# Patient Record
Sex: MALE | Race: WHITE | NOT HISPANIC OR LATINO | Employment: OTHER | URBAN - METROPOLITAN AREA
[De-identification: names, ages, dates, MRNs, and addresses within clinical notes are randomized per-mention and may not be internally consistent; named-entity substitution may affect disease eponyms.]

---

## 2017-03-23 ENCOUNTER — FOLLOW UP (OUTPATIENT)
Dept: URBAN - METROPOLITAN AREA CLINIC 26 | Facility: CLINIC | Age: 67
End: 2017-03-23

## 2017-03-23 DIAGNOSIS — H40.1120: ICD-10-CM

## 2017-03-23 DIAGNOSIS — H35.373: ICD-10-CM

## 2017-03-23 DIAGNOSIS — H35.341: ICD-10-CM

## 2017-03-23 DIAGNOSIS — H43.392: ICD-10-CM

## 2017-03-23 PROCEDURE — 92014 COMPRE OPH EXAM EST PT 1/>: CPT

## 2017-03-23 PROCEDURE — G8427 DOCREV CUR MEDS BY ELIG CLIN: HCPCS

## 2017-03-23 PROCEDURE — 1036F TOBACCO NON-USER: CPT

## 2017-03-23 PROCEDURE — 2027F OPTIC NERVE HEAD EVAL DONE: CPT

## 2017-03-23 PROCEDURE — 92250 FUNDUS PHOTOGRAPHY W/I&R: CPT

## 2017-03-23 ASSESSMENT — VISUAL ACUITY
OD_CC: 20/40
OS_CC: 20/40-

## 2017-03-23 ASSESSMENT — TONOMETRY
OS_IOP_MMHG: 18
OD_IOP_MMHG: 14

## 2017-08-04 ENCOUNTER — FOLLOW UP (OUTPATIENT)
Dept: URBAN - METROPOLITAN AREA CLINIC 26 | Facility: CLINIC | Age: 67
End: 2017-08-04

## 2017-08-04 VITALS — WEIGHT: 315 LBS | BODY MASS INDEX: 45.1 KG/M2 | HEIGHT: 70 IN

## 2017-08-04 DIAGNOSIS — H02.833: ICD-10-CM

## 2017-08-04 DIAGNOSIS — H02.836: ICD-10-CM

## 2017-08-04 DIAGNOSIS — H25.12: ICD-10-CM

## 2017-08-04 DIAGNOSIS — H35.373: ICD-10-CM

## 2017-08-04 DIAGNOSIS — H43.392: ICD-10-CM

## 2017-08-04 DIAGNOSIS — H40.1120: ICD-10-CM

## 2017-08-04 DIAGNOSIS — H35.341: ICD-10-CM

## 2017-08-04 PROCEDURE — 92014 COMPRE OPH EXAM EST PT 1/>: CPT

## 2017-08-04 PROCEDURE — 1036F TOBACCO NON-USER: CPT

## 2017-08-04 PROCEDURE — 92250 FUNDUS PHOTOGRAPHY W/I&R: CPT

## 2017-08-04 PROCEDURE — G8417 CALC BMI ABV UP PARAM F/U: HCPCS

## 2017-08-04 PROCEDURE — 2027F OPTIC NERVE HEAD EVAL DONE: CPT

## 2017-08-04 PROCEDURE — 92134 CPTRZ OPH DX IMG PST SGM RTA: CPT

## 2017-08-04 PROCEDURE — 92083 EXTENDED VISUAL FIELD XM: CPT

## 2017-08-04 PROCEDURE — G8427 DOCREV CUR MEDS BY ELIG CLIN: HCPCS

## 2017-08-04 ASSESSMENT — VISUAL ACUITY
OD_CC: 20/25+1
OS_CC: 20/40-1

## 2017-08-04 ASSESSMENT — TONOMETRY
OD_IOP_MMHG: 15
OS_IOP_MMHG: 15

## 2017-12-26 ENCOUNTER — APPOINTMENT (RX ONLY)
Dept: URBAN - METROPOLITAN AREA CLINIC 121 | Facility: CLINIC | Age: 67
Setting detail: DERMATOLOGY
End: 2017-12-26

## 2017-12-26 DIAGNOSIS — L21.8 OTHER SEBORRHEIC DERMATITIS: ICD-10-CM

## 2017-12-26 DIAGNOSIS — I87.2 VENOUS INSUFFICIENCY (CHRONIC) (PERIPHERAL): ICD-10-CM

## 2017-12-26 DIAGNOSIS — D485 NEOPLASM OF UNCERTAIN BEHAVIOR OF SKIN: ICD-10-CM

## 2017-12-26 DIAGNOSIS — L40.0 PSORIASIS VULGARIS: ICD-10-CM

## 2017-12-26 PROBLEM — I83.11 VARICOSE VEINS OF RIGHT LOWER EXTREMITY WITH INFLAMMATION: Status: ACTIVE | Noted: 2017-12-26

## 2017-12-26 PROBLEM — D48.5 NEOPLASM OF UNCERTAIN BEHAVIOR OF SKIN: Status: ACTIVE | Noted: 2017-12-26

## 2017-12-26 PROBLEM — I83.12 VARICOSE VEINS OF LEFT LOWER EXTREMITY WITH INFLAMMATION: Status: ACTIVE | Noted: 2017-12-26

## 2017-12-26 PROCEDURE — 99202 OFFICE O/P NEW SF 15 MIN: CPT | Mod: 25

## 2017-12-26 PROCEDURE — ? PRESCRIPTION

## 2017-12-26 PROCEDURE — ? BIOPSY BY SHAVE METHOD

## 2017-12-26 PROCEDURE — 11100: CPT

## 2017-12-26 PROCEDURE — ? DIAGNOSIS COMMENT

## 2017-12-26 PROCEDURE — ? COUNSELING

## 2017-12-26 RX ORDER — TRIAMCINOLONE ACETONIDE 1 MG/G
CREAM TOPICAL BID
Qty: 1 | Refills: 0 | Status: ERX | COMMUNITY
Start: 2017-12-26

## 2017-12-26 RX ADMIN — TRIAMCINOLONE ACETONIDE: 1 CREAM TOPICAL at 18:35

## 2017-12-26 ASSESSMENT — LOCATION SIMPLE DESCRIPTION DERM
LOCATION SIMPLE: RIGHT ELBOW
LOCATION SIMPLE: NOSE
LOCATION SIMPLE: LEFT FOREHEAD
LOCATION SIMPLE: RIGHT FOREHEAD
LOCATION SIMPLE: LEFT PRETIBIAL REGION
LOCATION SIMPLE: LEFT ELBOW
LOCATION SIMPLE: RIGHT PRETIBIAL REGION

## 2017-12-26 ASSESSMENT — LOCATION DETAILED DESCRIPTION DERM
LOCATION DETAILED: LEFT INFERIOR FOREHEAD
LOCATION DETAILED: RIGHT PROXIMAL PRETIBIAL REGION
LOCATION DETAILED: RIGHT INFERIOR FOREHEAD
LOCATION DETAILED: LEFT PROXIMAL PRETIBIAL REGION
LOCATION DETAILED: LEFT ELBOW
LOCATION DETAILED: NASAL ROOT
LOCATION DETAILED: RIGHT ELBOW
LOCATION DETAILED: LEFT DISTAL PRETIBIAL REGION

## 2017-12-26 ASSESSMENT — LOCATION ZONE DERM
LOCATION ZONE: ARM
LOCATION ZONE: LEG
LOCATION ZONE: NOSE
LOCATION ZONE: FACE

## 2017-12-26 NOTE — PROCEDURE: MIPS QUALITY
Quality 111:Pneumonia Vaccination Status For Older Adults: Pneumococcal Vaccination Administered
Quality 131: Pain Assessment And Follow-Up: Pain assessment using a standardized tool is documented as negative, no follow-up plan required
Quality 226: Preventive Care And Screening: Tobacco Use: Screening And Cessation Intervention: Patient screened for tobacco and is an ex-smoker
Detail Level: Detailed
Quality 110: Preventive Care And Screening: Influenza Immunization: Influenza Immunization previously received during influenza season
Quality 130: Documentation Of Current Medications In The Medical Record: Current Medications Documented

## 2017-12-26 NOTE — PROCEDURE: BIOPSY BY SHAVE METHOD
Biopsy Method: Dermablade
Destruction After The Procedure: No
Lab: [facility]
Type Of Destruction Used: Curettage
Billing Type: [employer]
Biopsy Type: H and E
Additional Anesthesia Volume In Cc (Will Not Render If 0): 0
Notification Instructions: Patient will be notified of biopsy results. However, patient instructed to call the office if not contacted within 2 weeks.
Post-Care Instructions: I reviewed with the patient in detail post-care instructions. Patient is to keep the biopsy site dry overnight, and then apply bacitracin twice daily until healed. Patient may apply hydrogen peroxide soaks to remove any crusting.
Cryotherapy Text: The wound bed was treated with cryotherapy after the biopsy was performed.
Dressing: bandage
Electrodesiccation And Curettage Text: The wound bed was treated with electrodesiccation and curettage after the biopsy was performed.
Curettage Text: The wound bed was treated with curettage after the biopsy was performed.
Hemostasis: Drysol
Size Of Lesion In Cm: 0.6
Wound Care: Petrolatum
Lab Facility: [facility]
Anesthesia Type: 1% lidocaine with epinephrine
Electrodesiccation Text: The wound bed was treated with electrodesiccation after the biopsy was performed.
Detail Level: Detailed
Silver Nitrate Text: The wound bed was treated with silver nitrate after the biopsy was performed.
Anesthesia Volume In Cc (Will Not Render If 0): 0.5
Consent: Written consent was obtained and risks were reviewed including but not limited to scarring, infection, bleeding, scabbing, incomplete removal, nerve damage and allergy to anesthesia.

## 2017-12-26 NOTE — PROCEDURE: DIAGNOSIS COMMENT
Comment: Pt declines treatment
Detail Level: Simple
Comment: Patient will use Triamcinonlone . 1% on elbows

## 2018-01-05 ENCOUNTER — FOLLOW UP (OUTPATIENT)
Dept: URBAN - METROPOLITAN AREA CLINIC 26 | Facility: CLINIC | Age: 68
End: 2018-01-05

## 2018-01-05 VITALS — WEIGHT: 315 LBS | BODY MASS INDEX: 45.1 KG/M2 | HEIGHT: 70 IN

## 2018-01-05 DIAGNOSIS — H43.392: ICD-10-CM

## 2018-01-05 DIAGNOSIS — H40.1120: ICD-10-CM

## 2018-01-05 DIAGNOSIS — H02.836: ICD-10-CM

## 2018-01-05 DIAGNOSIS — H35.341: ICD-10-CM

## 2018-01-05 DIAGNOSIS — H25.12: ICD-10-CM

## 2018-01-05 DIAGNOSIS — H02.833: ICD-10-CM

## 2018-01-05 DIAGNOSIS — H35.373: ICD-10-CM

## 2018-01-05 PROCEDURE — 92134 CPTRZ OPH DX IMG PST SGM RTA: CPT

## 2018-01-05 PROCEDURE — 92250 FUNDUS PHOTOGRAPHY W/I&R: CPT

## 2018-01-05 PROCEDURE — 92014 COMPRE OPH EXAM EST PT 1/>: CPT

## 2018-01-05 PROCEDURE — G8417 CALC BMI ABV UP PARAM F/U: HCPCS

## 2018-01-05 PROCEDURE — G8427 DOCREV CUR MEDS BY ELIG CLIN: HCPCS

## 2018-01-05 PROCEDURE — 2027F OPTIC NERVE HEAD EVAL DONE: CPT

## 2018-01-05 PROCEDURE — 1036F TOBACCO NON-USER: CPT

## 2018-01-05 ASSESSMENT — TONOMETRY
OD_IOP_MMHG: 15
OS_IOP_MMHG: 17

## 2018-01-05 ASSESSMENT — VISUAL ACUITY
OD_SC: 20/25-2
OS_SC: 20/25-1

## 2018-01-30 ENCOUNTER — RX ONLY (OUTPATIENT)
Age: 68
Setting detail: RX ONLY
End: 2018-01-30

## 2018-01-30 RX ORDER — TRIAMCINOLONE ACETONIDE 1 MG/G
CREAM TOPICAL
Qty: 1 | Refills: 0 | Status: ERX

## 2018-08-10 ENCOUNTER — FOLLOW UP (OUTPATIENT)
Dept: URBAN - METROPOLITAN AREA CLINIC 26 | Facility: CLINIC | Age: 68
End: 2018-08-10

## 2018-08-10 DIAGNOSIS — H40.1120: ICD-10-CM

## 2018-08-10 DIAGNOSIS — H35.373: ICD-10-CM

## 2018-08-10 DIAGNOSIS — H43.392: ICD-10-CM

## 2018-08-10 DIAGNOSIS — H35.341: ICD-10-CM

## 2018-08-10 PROCEDURE — 92250 FUNDUS PHOTOGRAPHY W/I&R: CPT

## 2018-08-10 PROCEDURE — 92014 COMPRE OPH EXAM EST PT 1/>: CPT

## 2018-08-10 ASSESSMENT — TONOMETRY
OS_IOP_MMHG: 16
OD_IOP_MMHG: 15

## 2018-08-10 ASSESSMENT — VISUAL ACUITY
OD_SC: 20/25
OS_SC: 20/25

## 2019-02-07 ENCOUNTER — FOLLOW UP (OUTPATIENT)
Dept: URBAN - METROPOLITAN AREA CLINIC 26 | Facility: CLINIC | Age: 69
End: 2019-02-07

## 2019-02-07 VITALS — BODY MASS INDEX: 45.1 KG/M2 | WEIGHT: 315 LBS | HEIGHT: 70 IN

## 2019-02-07 DIAGNOSIS — H40.1120: ICD-10-CM

## 2019-02-07 DIAGNOSIS — H43.392: ICD-10-CM

## 2019-02-07 DIAGNOSIS — H35.373: ICD-10-CM

## 2019-02-07 DIAGNOSIS — H35.341: ICD-10-CM

## 2019-02-07 PROCEDURE — 92250 FUNDUS PHOTOGRAPHY W/I&R: CPT

## 2019-02-07 PROCEDURE — 92014 COMPRE OPH EXAM EST PT 1/>: CPT

## 2019-02-07 PROCEDURE — 92083 EXTENDED VISUAL FIELD XM: CPT

## 2019-02-07 ASSESSMENT — TONOMETRY
OS_IOP_MMHG: 24
OD_IOP_MMHG: 18

## 2019-02-07 ASSESSMENT — VISUAL ACUITY
OS_PH: 20/25+2
OS_SC: 20/30
OD_SC: 20/25-2

## 2019-08-08 ENCOUNTER — FOLLOW UP (OUTPATIENT)
Dept: URBAN - METROPOLITAN AREA CLINIC 26 | Facility: CLINIC | Age: 69
End: 2019-08-08

## 2019-08-08 DIAGNOSIS — H43.392: ICD-10-CM

## 2019-08-08 DIAGNOSIS — H35.373: ICD-10-CM

## 2019-08-08 DIAGNOSIS — H02.836: ICD-10-CM

## 2019-08-08 DIAGNOSIS — H40.1120: ICD-10-CM

## 2019-08-08 DIAGNOSIS — H35.341: ICD-10-CM

## 2019-08-08 DIAGNOSIS — H02.833: ICD-10-CM

## 2019-08-08 PROCEDURE — 92250 FUNDUS PHOTOGRAPHY W/I&R: CPT

## 2019-08-08 PROCEDURE — 92014 COMPRE OPH EXAM EST PT 1/>: CPT

## 2019-08-08 PROCEDURE — 92083 EXTENDED VISUAL FIELD XM: CPT

## 2019-08-08 ASSESSMENT — VISUAL ACUITY
OD_SC: 20/30-1
OS_SC: 20/30+1
OS_PH: 20/25
OD_PH: 20/25-1

## 2019-08-08 ASSESSMENT — TONOMETRY
OS_IOP_MMHG: 16
OD_IOP_MMHG: 16

## 2022-07-09 ENCOUNTER — TELEPHONE ENCOUNTER (OUTPATIENT)
Dept: URBAN - METROPOLITAN AREA CLINIC 121 | Facility: CLINIC | Age: 72
End: 2022-07-09

## 2022-07-09 RX ORDER — ALBUTEROL SULFATE 90 UG/1
AEROSOL, METERED RESPIRATORY (INHALATION)
Refills: 0 | OUTPATIENT
Start: 2014-04-24 | End: 2016-03-03

## 2022-07-09 RX ORDER — FUROSEMIDE 20 MG/1
TABLET ORAL
Refills: 0 | OUTPATIENT
Start: 2014-04-24 | End: 2016-03-03

## 2022-07-09 RX ORDER — PANTOPRAZOLE SODIUM 40 MG/1
TABLET, DELAYED RELEASE ORAL ONCE A DAY
Refills: 0 | OUTPATIENT
Start: 2014-01-30 | End: 2014-01-30

## 2022-07-09 RX ORDER — ALBUTEROL SULFATE 90 UG/1
AEROSOL, METERED RESPIRATORY (INHALATION)
Refills: 0 | OUTPATIENT
Start: 2015-01-29 | End: 2016-03-03

## 2022-07-09 RX ORDER — POTASSIUM CHLORIDE 750 MG/1
TABLET, FILM COATED, EXTENDED RELEASE ORAL
Refills: 0 | OUTPATIENT
Start: 2014-04-24 | End: 2016-03-03

## 2022-07-09 RX ORDER — LOSARTAN POTASSIUM 50 MG/1
TABLET, FILM COATED ORAL
Refills: 0 | OUTPATIENT
Start: 2014-04-24 | End: 2016-03-03

## 2022-07-09 RX ORDER — POTASSIUM CHLORIDE 10 MEQ/100ML
INJECTION INTRAVENOUS ONCE A DAY
Refills: 0 | OUTPATIENT
Start: 2016-03-03 | End: 2016-04-07

## 2022-07-09 RX ORDER — NYSTATIN 100000 [USP'U]/G
CREAM ORAL; TOPICAL
Refills: 0 | OUTPATIENT
Start: 2009-10-21 | End: 2014-01-30

## 2022-07-09 RX ORDER — ATORVASTATIN CALCIUM 10 MG/1
TABLET, FILM COATED ORAL ONCE A DAY
Refills: 0 | OUTPATIENT
Start: 2016-03-03 | End: 2016-04-07

## 2022-07-09 RX ORDER — CARVEDILOL 3.12 MG/1
TABLET, FILM COATED ORAL
Refills: 0 | OUTPATIENT
Start: 2014-01-30 | End: 2014-04-24

## 2022-07-09 RX ORDER — LEVOTHYROXINE SODIUM 0.14 MG/1
TABLET ORAL
Refills: 0 | OUTPATIENT
Start: 2014-01-30 | End: 2014-04-24

## 2022-07-09 RX ORDER — SULFASALAZINE 500 MG/1
TABLET ORAL
Refills: 3 | OUTPATIENT
Start: 2012-10-04 | End: 2013-07-03

## 2022-07-09 RX ORDER — PSYLLIUM HUSK 0.4 G
CAPSULE ORAL
Refills: 0 | OUTPATIENT
Start: 2014-04-24 | End: 2016-03-03

## 2022-07-09 RX ORDER — PANTOPRAZOLE SODIUM 40 MG/1
TABLET, DELAYED RELEASE ORAL ONCE A DAY
Refills: 0 | OUTPATIENT
Start: 2012-09-27 | End: 2014-01-30

## 2022-07-09 RX ORDER — LOSARTAN POTASSIUM 50 MG/1
TABLET, FILM COATED ORAL
Refills: 0 | OUTPATIENT
Start: 2014-01-30 | End: 2014-04-24

## 2022-07-09 RX ORDER — CHLORHEXIDINE GLUCONATE 4 %
LIQUID (ML) TOPICAL
Refills: 0 | OUTPATIENT
Start: 2015-01-29 | End: 2016-03-03

## 2022-07-09 RX ORDER — PANTOPRAZOLE SODIUM 40 MG/1
TABLET, DELAYED RELEASE ORAL ONCE A DAY
Refills: 0 | OUTPATIENT
Start: 2014-01-30 | End: 2014-04-24

## 2022-07-09 RX ORDER — POTASSIUM CHLORIDE 750 MG/1
TABLET, FILM COATED, EXTENDED RELEASE ORAL
Refills: 0 | OUTPATIENT
Start: 2014-01-30 | End: 2014-04-24

## 2022-07-09 RX ORDER — TAMSULOSIN HCL 0.4 MG
CAPSULE ORAL
Refills: 0 | OUTPATIENT
Start: 2014-01-30 | End: 2014-04-24

## 2022-07-09 RX ORDER — HYDROCODONE BITARTRATE AND ACETAMINOPHEN 7.5; 325 MG/1; MG/1
1 TAB PO BID PRN PAIN TABLET ORAL AS NEEDED
Refills: 0 | OUTPATIENT
Start: 2013-07-02 | End: 2013-07-30

## 2022-07-09 RX ORDER — TERBINAFINE 250 MG/1
TABLET ORAL
Refills: 0 | OUTPATIENT
Start: 2014-01-30 | End: 2014-04-24

## 2022-07-09 RX ORDER — CARVEDILOL 3.12 MG/1
TABLET, FILM COATED ORAL
Refills: 0 | OUTPATIENT
Start: 2014-04-24 | End: 2016-03-03

## 2022-07-09 RX ORDER — LEVOTHYROXINE SODIUM 150 UG/1
TABLET ORAL
Refills: 0 | OUTPATIENT
Start: 2009-10-21 | End: 2014-01-30

## 2022-07-09 RX ORDER — TERBINAFINE 250 MG/1
TABLET ORAL
Refills: 0 | OUTPATIENT
Start: 2014-04-24 | End: 2016-03-03

## 2022-07-09 RX ORDER — PSYLLIUM HUSK 0.4 G
CAPSULE ORAL
Refills: 0 | OUTPATIENT
Start: 2014-01-30 | End: 2014-04-24

## 2022-07-09 RX ORDER — ALBUTEROL SULFATE 90 UG/1
AEROSOL, METERED RESPIRATORY (INHALATION)
Refills: 0 | OUTPATIENT
Start: 2014-01-30 | End: 2014-04-24

## 2022-07-09 RX ORDER — LEVOTHYROXINE SODIUM 200 UG/1
TABLET ORAL ONCE A DAY
Refills: 0 | OUTPATIENT
Start: 2016-03-03 | End: 2016-04-07

## 2022-07-09 RX ORDER — FUROSEMIDE 20 MG/1
TABLET ORAL ONCE A DAY
Refills: 0 | OUTPATIENT
Start: 2016-03-03 | End: 2016-04-07

## 2022-07-09 RX ORDER — FUROSEMIDE 40 MG/1
TABLET ORAL
Refills: 0 | OUTPATIENT
Start: 2009-10-21 | End: 2014-01-30

## 2022-07-09 RX ORDER — HYDROCODONE BITARTRATE AND ACETAMINOPHEN 7.5; 325 MG/1; MG/1
TAKE 1 TABLET PO BID PRN ABDOMINAL PAIN TABLET ORAL TAKE AS DIRECTED
Refills: 0 | OUTPATIENT
Start: 2013-08-09 | End: 2014-01-30

## 2022-07-09 RX ORDER — ALBUTEROL SULFATE 90 UG/1
AEROSOL, METERED RESPIRATORY (INHALATION) AS NEEDED
Refills: 0 | OUTPATIENT
Start: 2016-03-03 | End: 2016-04-07

## 2022-07-09 RX ORDER — FUROSEMIDE 40 MG/1
TABLET ORAL
Refills: 0 | OUTPATIENT
Start: 2014-01-30 | End: 2014-01-30

## 2022-07-09 RX ORDER — FUROSEMIDE 20 MG/1
TABLET ORAL
Refills: 0 | OUTPATIENT
Start: 2014-01-30 | End: 2014-04-24

## 2022-07-09 RX ORDER — PANTOPRAZOLE SODIUM 40 MG/1
TABLET, DELAYED RELEASE ORAL
Refills: 0 | OUTPATIENT
Start: 2014-04-24 | End: 2016-03-03

## 2022-07-09 RX ORDER — TAMSULOSIN HYDROCHLORIDE 0.4 MG/1
CAPSULE ORAL ONCE A DAY
Refills: 0 | OUTPATIENT
Start: 2016-03-03 | End: 2016-04-07

## 2022-07-09 RX ORDER — PREDNISONE 10 MG/1
TABLET ORAL
Refills: 1 | OUTPATIENT
Start: 2013-05-09 | End: 2013-07-03

## 2022-07-09 RX ORDER — TAMSULOSIN HCL 0.4 MG
CAPSULE ORAL
Refills: 0 | OUTPATIENT
Start: 2014-04-24 | End: 2016-03-03

## 2022-07-09 RX ORDER — CARVEDILOL 3.12 MG/1
TABLET, FILM COATED ORAL ONCE A DAY
Refills: 0 | OUTPATIENT
Start: 2016-03-03 | End: 2016-04-07

## 2022-07-09 RX ORDER — ASPIRIN 81 MG/1
TABLET, COATED ORAL ONCE A DAY
Refills: 0 | OUTPATIENT
Start: 2016-03-03 | End: 2016-04-07

## 2022-07-09 RX ORDER — POLYETHYLENE GLYCOL 3350, SODIUM SULFATE, SODIUM CHLORIDE, POTASSIUM CHLORIDE, ASCORBIC ACID, SODIUM ASCORBATE 7.5-2.691G
USE AS DIRECTED FOR COLON PREP KIT ORAL AS DIRECTED
Refills: 0 | OUTPATIENT
Start: 2012-09-13 | End: 2013-07-03

## 2022-07-09 RX ORDER — LEVOTHYROXINE SODIUM 0.14 MG/1
TABLET ORAL
Refills: 0 | OUTPATIENT
Start: 2014-04-24 | End: 2016-03-03

## 2022-07-09 RX ORDER — LOSARTAN POTASSIUM 50 MG/1
TABLET, FILM COATED ORAL ONCE A DAY
Refills: 0 | OUTPATIENT
Start: 2016-03-03 | End: 2016-04-07

## 2022-07-09 RX ORDER — FERROUS FUM/VIT C/B12-IF/FOLIC 110-0.5MG
CAPSULE ORAL TWICE A DAY
Refills: 0 | OUTPATIENT
Start: 2016-03-03 | End: 2016-04-07

## 2022-07-10 ENCOUNTER — TELEPHONE ENCOUNTER (OUTPATIENT)
Dept: URBAN - METROPOLITAN AREA CLINIC 121 | Facility: CLINIC | Age: 72
End: 2022-07-10

## 2022-07-10 RX ORDER — HYDROCORTISONE ACETATE 25 MG/1
ONE PER RECTUM QHS FOR ULCERATIVE PROCTITIS SUPPOSITORY RECTAL TAKE AS DIRECTED
Refills: 0 | Status: ACTIVE | COMMUNITY
Start: 2014-04-24

## 2022-07-10 RX ORDER — ALBUTEROL SULFATE 90 UG/1
AEROSOL, METERED RESPIRATORY (INHALATION) AS NEEDED
Refills: 0 | Status: ACTIVE | COMMUNITY
Start: 2016-04-07

## 2022-07-10 RX ORDER — PREDNISONE 10 MG/1
TABLET ORAL
Refills: 0 | Status: ACTIVE | COMMUNITY
Start: 2013-07-02

## 2022-07-10 RX ORDER — LOSARTAN POTASSIUM 50 MG/1
TABLET, FILM COATED ORAL ONCE A DAY
Refills: 0 | Status: ACTIVE | COMMUNITY
Start: 2016-04-07

## 2022-07-10 RX ORDER — HYDROCODONE BITARTRATE AND ACETAMINOPHEN 7.5; 325 MG/1; MG/1
1 TAB PO BID PRN PAIN TABLET ORAL AS NEEDED
Refills: 0 | Status: ACTIVE | COMMUNITY
Start: 2013-07-30

## 2022-07-10 RX ORDER — CARVEDILOL 3.12 MG/1
TABLET, FILM COATED ORAL ONCE A DAY
Refills: 0 | Status: ACTIVE | COMMUNITY
Start: 2016-04-07

## 2022-07-10 RX ORDER — PHENOBARBITAL, HYOSCYAMINE SULFATE, ATROPINE SULFATE, SCOPOLAMINE HYDROBROMIDE 16.2; .1037; .0194; .0065 MG/1; MG/1; MG/1; MG/1
1-2 PO TID PRN ABDOMINAL PAIN TABLET ORAL THREE TIMES A DAY
Refills: 2 | Status: ACTIVE | COMMUNITY
Start: 2012-11-28

## 2022-07-10 RX ORDER — ATORVASTATIN CALCIUM 10 MG/1
TABLET, FILM COATED ORAL ONCE A DAY
Refills: 0 | Status: ACTIVE | COMMUNITY
Start: 2016-04-07

## 2022-07-10 RX ORDER — HYDROCORTISONE ACETATE 25 MG/1
SUPPOSITORY RECTAL TWICE A DAY
Refills: 2 | Status: ACTIVE | COMMUNITY
Start: 2009-10-21

## 2022-07-10 RX ORDER — POTASSIUM CHLORIDE 10 MEQ/100ML
INJECTION INTRAVENOUS ONCE A DAY
Refills: 0 | Status: ACTIVE | COMMUNITY
Start: 2016-04-07

## 2022-07-10 RX ORDER — LEVOTHYROXINE SODIUM 200 UG/1
TABLET ORAL ONCE A DAY
Refills: 0 | Status: ACTIVE | COMMUNITY
Start: 2016-04-07

## 2022-07-10 RX ORDER — AZATHIOPRINE 75 MG/1
TABLET ORAL ONCE A DAY
Refills: 3 | Status: ACTIVE | COMMUNITY
Start: 2013-08-01

## 2022-07-10 RX ORDER — TAMSULOSIN HYDROCHLORIDE 0.4 MG/1
CAPSULE ORAL ONCE A DAY
Refills: 0 | Status: ACTIVE | COMMUNITY
Start: 2016-04-07

## 2022-07-10 RX ORDER — HYDROCODONE BITARTRATE AND ACETAMINOPHEN 7.5; 325 MG/1; MG/1
TAKE 1 TABLET PO BID PRN ABDOMINAL PAIN. THIS MEDICATION WAS CALLED INTO PHARMACY TABLET ORAL TWICE A DAY
Refills: 0 | Status: ACTIVE | COMMUNITY
Start: 2013-08-12

## 2022-07-10 RX ORDER — FUROSEMIDE 20 MG/1
TABLET ORAL ONCE A DAY
Refills: 0 | Status: ACTIVE | COMMUNITY
Start: 2016-04-07

## 2022-07-10 RX ORDER — BUDESONIDE 28 MG/1
ONCE A DAY PER RECTUM AEROSOL, FOAM RECTAL ONCE A DAY
Refills: 3 | Status: ACTIVE | COMMUNITY
Start: 2016-04-07

## 2022-07-10 RX ORDER — ASPIRIN 81 MG/1
TABLET, COATED ORAL ONCE A DAY
Refills: 0 | Status: ACTIVE | COMMUNITY
Start: 2016-04-07

## 2022-07-10 RX ORDER — FERROUS FUM/VIT C/B12-IF/FOLIC 110-0.5MG
CAPSULE ORAL TWICE A DAY
Refills: 0 | Status: ACTIVE | COMMUNITY
Start: 2016-04-07